# Patient Record
Sex: MALE | Race: OTHER | Employment: FULL TIME | URBAN - METROPOLITAN AREA
[De-identification: names, ages, dates, MRNs, and addresses within clinical notes are randomized per-mention and may not be internally consistent; named-entity substitution may affect disease eponyms.]

---

## 2023-10-30 ENCOUNTER — OFFICE VISIT (OUTPATIENT)
Dept: PRIMARY CARE | Facility: CLINIC | Age: 38
End: 2023-10-30
Payer: COMMERCIAL

## 2023-10-30 ENCOUNTER — APPOINTMENT (OUTPATIENT)
Dept: PRIMARY CARE | Facility: CLINIC | Age: 38
End: 2023-10-30
Payer: COMMERCIAL

## 2023-10-30 ENCOUNTER — PHARMACY VISIT (OUTPATIENT)
Dept: PHARMACY | Facility: CLINIC | Age: 38
End: 2023-10-30
Payer: MEDICARE

## 2023-10-30 VITALS
HEART RATE: 62 BPM | DIASTOLIC BLOOD PRESSURE: 70 MMHG | BODY MASS INDEX: 23.52 KG/M2 | HEIGHT: 71 IN | TEMPERATURE: 97.5 F | WEIGHT: 168 LBS | OXYGEN SATURATION: 98 % | SYSTOLIC BLOOD PRESSURE: 132 MMHG

## 2023-10-30 DIAGNOSIS — B02.9 HERPES ZOSTER WITHOUT COMPLICATION: Primary | ICD-10-CM

## 2023-10-30 PROCEDURE — 99213 OFFICE O/P EST LOW 20 MIN: CPT | Performed by: NURSE PRACTITIONER

## 2023-10-30 PROCEDURE — 1036F TOBACCO NON-USER: CPT | Performed by: NURSE PRACTITIONER

## 2023-10-30 PROCEDURE — RXMED WILLOW AMBULATORY MEDICATION CHARGE

## 2023-10-30 RX ORDER — PREDNISONE 20 MG/1
40 TABLET ORAL DAILY
Qty: 10 TABLET | Refills: 0 | Status: SHIPPED | OUTPATIENT
Start: 2023-10-30 | End: 2023-11-04

## 2023-10-30 RX ORDER — VALACYCLOVIR HYDROCHLORIDE 1 G/1
1000 TABLET, FILM COATED ORAL 3 TIMES DAILY
Qty: 21 TABLET | Refills: 0 | Status: SHIPPED | OUTPATIENT
Start: 2023-10-30 | End: 2023-11-06

## 2023-10-30 ASSESSMENT — PAIN SCALES - GENERAL: PAINLEVEL: 5

## 2023-10-30 NOTE — PROGRESS NOTES
"Subjective   Patient ID: Bandar Shin is a 38 y.o. male who presents for Rash (Abd & shoulder x 3 days ).    HPI   Patient complains of rash to his back and abdomen that began 3 days ago  Area is painful on his back  He describes burning  He denies any recent illnesses, fever  No OTC used    Review of Systems  Review of systems negative with exception as above     Objective   /70 (BP Location: Left arm)   Pulse 62   Temp 36.4 °C (97.5 °F) (Temporal)   Ht 1.803 m (5' 11\")   Wt 76.2 kg (168 lb)   SpO2 98%   BMI 23.43 kg/m²     Physical Exam  Alert and oriented x3, no acute distress  Breathing unlabored  Patient with cluster of vesicles to left thoracic back and anterior chest following dermatome T6 or T7  No crusting or drainage  Neuro grossly intact    Assessment/Plan   Diagnoses and all orders for this visit:  Herpes zoster without complication  -     valACYclovir (Valtrex) 1 gram tablet; Take 1 tablet (1,000 mg) by mouth 3 times a day for 7 days.  -     predniSONE (Deltasone) 20 mg tablet; Take 2 tablets (40 mg) by mouth once daily for 5 days.  Needs better control  Shingles education Provided  Medications as prescribed  Keep area covered  Wash everything in warm water  Follow-up 1 week for reevaluation  Other orders  -     Follow Up In Primary Care - Established; Future       "

## 2023-10-30 NOTE — PATIENT INSTRUCTIONS
Thank you for choosing our office for your healthcare needs    You are diagnosed with shingles  Take medications as prescribed    See printed shingles education.     Follow up 1 week for reevaluation

## 2023-11-06 ENCOUNTER — OFFICE VISIT (OUTPATIENT)
Dept: PRIMARY CARE | Facility: CLINIC | Age: 38
End: 2023-11-06
Payer: COMMERCIAL

## 2023-11-06 VITALS
OXYGEN SATURATION: 99 % | SYSTOLIC BLOOD PRESSURE: 124 MMHG | HEART RATE: 60 BPM | BODY MASS INDEX: 23.57 KG/M2 | TEMPERATURE: 97.3 F | DIASTOLIC BLOOD PRESSURE: 78 MMHG | WEIGHT: 169 LBS

## 2023-11-06 DIAGNOSIS — B02.9 HERPES ZOSTER WITHOUT COMPLICATION: Primary | ICD-10-CM

## 2023-11-06 DIAGNOSIS — J06.9 ACUTE URI: ICD-10-CM

## 2023-11-06 PROCEDURE — 1036F TOBACCO NON-USER: CPT | Performed by: NURSE PRACTITIONER

## 2023-11-06 PROCEDURE — 99213 OFFICE O/P EST LOW 20 MIN: CPT | Performed by: NURSE PRACTITIONER

## 2023-11-06 ASSESSMENT — PAIN SCALES - GENERAL: PAINLEVEL: 3

## 2023-11-06 NOTE — PROGRESS NOTES
Subjective   Patient ID: Bandar Shin is a 38 y.o. male who presents for Follow-up (Shingles. ).    HPI   Bandar is a 38-year-old male who presents for shingles reevaluation  He was diagnosed with shingles to left chest 10/30  He was prescribed Valtrex and prednisone which he has used with relief  Patient states rash is improved, he has very intermittent pain that he would rate as a 1. Overall feels much better    He does complain of some mild chest congestion and cough that started approximately 3 to 4 days ago. He is using Mucinex as needed    Review of Systems  Negative for fever, chills, headache, dizziness  Negative for ear pain, sore throat, Sinus pressure  Positive for nasal congestion  Negative for chest pain, shortness of breath.  Positive for cough  Negative for ABD pain, nausea, vomiting, bowel change  Positive for improved rash.  Negative for crusting, drainage    Objective   /78   Pulse 60   Temp 36.3 °C (97.3 °F)   Wt 76.7 kg (169 lb)   SpO2 99%   BMI 23.57 kg/m²     Physical Exam  Alert and oriented x3, no acute distress  Neck supple  Lungs clear to auscultation bilaterally with moist cough.  Heart with regular rate and rhythm  Patient with Patches of erythema left thoracic back and anterior chest following dermatome T6 or T7  No crusting or drainage  Neuro grossly intact     Assessment/Plan   Diagnoses and all orders for this visit:  Herpes zoster without complication  Shingles has improved  Rash is fading.  There is no evidence of cellulitis  Pain has improved greatly And is almost dissipated  He will complete Valtrex today  Follow-up as needed  Acute URI  Viral versus bacterial discussed  Encourage fluids, rest  OTC as directed  Humidifier, saline nasal spray  Follow-up if not improving over the next 7 to 10 days, Sooner if worse  Other orders  -     Follow Up In Primary Care - Established

## 2023-11-08 ENCOUNTER — APPOINTMENT (OUTPATIENT)
Dept: PRIMARY CARE | Facility: CLINIC | Age: 38
End: 2023-11-08
Payer: COMMERCIAL

## 2024-09-26 ENCOUNTER — OFFICE VISIT (OUTPATIENT)
Dept: PRIMARY CARE | Facility: CLINIC | Age: 39
End: 2024-09-26
Payer: COMMERCIAL

## 2024-09-26 ENCOUNTER — PHARMACY VISIT (OUTPATIENT)
Dept: PHARMACY | Facility: CLINIC | Age: 39
End: 2024-09-26
Payer: MEDICARE

## 2024-09-26 VITALS
BODY MASS INDEX: 22.96 KG/M2 | SYSTOLIC BLOOD PRESSURE: 120 MMHG | HEIGHT: 71 IN | WEIGHT: 164 LBS | OXYGEN SATURATION: 99 % | HEART RATE: 66 BPM | TEMPERATURE: 98 F | DIASTOLIC BLOOD PRESSURE: 70 MMHG

## 2024-09-26 DIAGNOSIS — B35.0 TINEA BARBAE: Primary | ICD-10-CM

## 2024-09-26 PROCEDURE — 1036F TOBACCO NON-USER: CPT

## 2024-09-26 PROCEDURE — 3008F BODY MASS INDEX DOCD: CPT

## 2024-09-26 PROCEDURE — 99213 OFFICE O/P EST LOW 20 MIN: CPT

## 2024-09-26 PROCEDURE — RXMED WILLOW AMBULATORY MEDICATION CHARGE

## 2024-09-26 RX ORDER — FLUCONAZOLE 150 MG/1
150 TABLET ORAL
Qty: 4 TABLET | Refills: 0 | Status: SHIPPED | OUTPATIENT
Start: 2024-09-26 | End: 2024-10-24

## 2024-09-26 ASSESSMENT — PATIENT HEALTH QUESTIONNAIRE - PHQ9
2. FEELING DOWN, DEPRESSED OR HOPELESS: NOT AT ALL
SUM OF ALL RESPONSES TO PHQ9 QUESTIONS 1 AND 2: 0
1. LITTLE INTEREST OR PLEASURE IN DOING THINGS: NOT AT ALL

## 2024-09-26 ASSESSMENT — PAIN SCALES - GENERAL: PAINLEVEL: 3

## 2024-09-26 NOTE — PROGRESS NOTES
"Subjective     Patient ID: Bandar Shin is a 39 y.o. male who presents for Rash (On face x 4 days after using cleaning chemicals).      HPI    Bandar presents with concerns for a rash in his beard area which he first noticed this past Monday. He itched the left side of his face within the key, kin was dry and flaky to the area.  He now notices red, dry flaking skin to the left side and to the chin area.  Denies pain or itching.   Admits he was cleaning his bathroom with commit and was concerned that possibly he touched his beard with cleaning solution; however, the reddened areas do not extend further than the margins of his beard.       Patient's recent visit notes, medication and allergy lists, past medical surgical social hx, immunization, vitals, problem list, recent tests were reviewed by me for pertinence to this visit.  No current outpatient medications on file.      Review of Systems  All other systems have been reviewed and are negative except as noted in the HPI.         Objective   /70 (BP Location: Left arm, Patient Position: Sitting)   Pulse 66   Temp 36.7 °C (98 °F) (Temporal)   Ht 1.803 m (5' 11\")   Wt 74.4 kg (164 lb)   SpO2 99%   BMI 22.87 kg/m²       Physical Exam  Vitals and nursing note reviewed.   Constitutional:       General: He is not in acute distress.     Appearance: Normal appearance.   HENT:      Head:      Comments: Red, rased patches and dry skin noted within beard, predominately on the chin below lower lip and left side of face but also smaller areas noted to the right side. Rash does not extend farther than the margins of his beard.   Neurological:      Mental Status: He is alert.   Psychiatric:         Behavior: Behavior is cooperative.             Assessment & Plan  Tinea barbae  Begin diflucan as discussed, 1 tablet by mouth weekly for the next 4 weeks.  Discussed beard hygiene, wash beard daily with head and shoulders shampoo, dry well after shower.   Follow up if " symptoms seem to worsen within the next 7-10 days.   Orders:    fluconazole (Diflucan) 150 mg tablet; Take 1 tablet (150 mg) by mouth 1 (one) time per week for 4 doses.          Patient understands and agrees with treatment plan.    Phyllis Marie, APRN-CNP